# Patient Record
Sex: MALE | Race: ASIAN | NOT HISPANIC OR LATINO | Employment: FULL TIME | URBAN - METROPOLITAN AREA
[De-identification: names, ages, dates, MRNs, and addresses within clinical notes are randomized per-mention and may not be internally consistent; named-entity substitution may affect disease eponyms.]

---

## 2021-09-27 ENCOUNTER — HOSPITAL ENCOUNTER (EMERGENCY)
Facility: HOSPITAL | Age: 61
Discharge: HOME | End: 2021-09-27
Attending: EMERGENCY MEDICINE
Payer: MEDICAID

## 2021-09-27 VITALS
HEART RATE: 78 BPM | SYSTOLIC BLOOD PRESSURE: 146 MMHG | HEIGHT: 67 IN | WEIGHT: 140 LBS | BODY MASS INDEX: 21.97 KG/M2 | DIASTOLIC BLOOD PRESSURE: 71 MMHG | RESPIRATION RATE: 19 BRPM | TEMPERATURE: 98 F | OXYGEN SATURATION: 97 %

## 2021-09-27 DIAGNOSIS — S01.81XA FACIAL LACERATION, INITIAL ENCOUNTER: ICD-10-CM
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
## 2021-09-27 DIAGNOSIS — S00.81XA ABRASION OF FACE, INITIAL ENCOUNTER: ICD-10-CM

## 2021-09-27 PROCEDURE — 12013 RPR F/E/E/N/L/M 2.6-5.0 CM: CPT

## 2021-09-27 PROCEDURE — 99284 EMERGENCY DEPT VISIT MOD MDM: CPT | Mod: 25

## 2021-09-27 PROCEDURE — 25000003 PHARM REV CODE 250: Performed by: PHYSICIAN ASSISTANT

## 2021-09-27 RX ORDER — LIDOCAINE HYDROCHLORIDE 10 MG/ML
10 INJECTION INFILTRATION; PERINEURAL
Status: COMPLETED | OUTPATIENT
Start: 2021-09-27 | End: 2021-09-27

## 2021-09-27 RX ADMIN — NEOMYCIN AND POLYMYXIN B SULFATES AND BACITRACIN ZINC: 400; 3.5; 5 OINTMENT TOPICAL at 03:09

## 2021-09-27 RX ADMIN — LIDOCAINE HYDROCHLORIDE 10 ML: 10 INJECTION, SOLUTION INFILTRATION; PERINEURAL at 03:09

## 2021-09-29 ENCOUNTER — HOSPITAL ENCOUNTER (EMERGENCY)
Facility: HOSPITAL | Age: 61
Discharge: HOME | End: 2021-09-29
Attending: EMERGENCY MEDICINE
Payer: MEDICAID

## 2021-09-29 VITALS
TEMPERATURE: 98 F | OXYGEN SATURATION: 98 % | HEIGHT: 67 IN | BODY MASS INDEX: 21.97 KG/M2 | SYSTOLIC BLOOD PRESSURE: 167 MMHG | WEIGHT: 140 LBS | DIASTOLIC BLOOD PRESSURE: 79 MMHG | HEART RATE: 79 BPM | RESPIRATION RATE: 16 BRPM

## 2021-09-29 DIAGNOSIS — Z48.02 VISIT FOR SUTURE REMOVAL: Primary | ICD-10-CM

## 2021-09-29 PROCEDURE — 99283 EMERGENCY DEPT VISIT LOW MDM: CPT

## 2021-09-29 RX ORDER — IBUPROFEN 600 MG/1
600 TABLET ORAL
Status: DISCONTINUED | OUTPATIENT
Start: 2021-09-29 | End: 2021-09-29 | Stop reason: HOSPADM

## 2021-09-29 RX ORDER — IBUPROFEN 600 MG/1
600 TABLET ORAL EVERY 6 HOURS PRN
Qty: 20 TABLET | Refills: 0 | Status: SHIPPED | OUTPATIENT
Start: 2021-09-29

## 2023-01-06 ENCOUNTER — HOSPITAL ENCOUNTER (EMERGENCY)
Facility: HOSPITAL | Age: 63
Discharge: HOME | End: 2023-01-06
Attending: EMERGENCY MEDICINE
Payer: MEDICAID

## 2023-01-06 VITALS
OXYGEN SATURATION: 98 % | RESPIRATION RATE: 17 BRPM | DIASTOLIC BLOOD PRESSURE: 72 MMHG | SYSTOLIC BLOOD PRESSURE: 124 MMHG | HEART RATE: 85 BPM | TEMPERATURE: 98 F

## 2023-01-06 DIAGNOSIS — U07.1 COVID-19: Primary | ICD-10-CM

## 2023-01-06 DIAGNOSIS — U07.1 COVID-19 VIRUS DETECTED: ICD-10-CM

## 2023-01-06 PROBLEM — J30.9 ALLERGIC RHINITIS: Status: ACTIVE | Noted: 2023-01-06

## 2023-01-06 LAB
CTP QC/QA: YES
MOLECULAR STREP A: NEGATIVE
POC MOLECULAR INFLUENZA A AGN: NEGATIVE
POC MOLECULAR INFLUENZA B AGN: NEGATIVE
SARS-COV-2 RDRP RESP QL NAA+PROBE: POSITIVE

## 2023-01-06 PROCEDURE — 87635 SARS-COV-2 COVID-19 AMP PRB: CPT | Performed by: EMERGENCY MEDICINE

## 2023-01-06 PROCEDURE — 87651 STREP A DNA AMP PROBE: CPT

## 2023-01-06 PROCEDURE — 87502 INFLUENZA DNA AMP PROBE: CPT

## 2023-01-06 PROCEDURE — 99284 EMERGENCY DEPT VISIT MOD MDM: CPT | Mod: 25

## 2023-01-06 RX ORDER — BENZONATATE 100 MG/1
100 CAPSULE ORAL 3 TIMES DAILY PRN
Qty: 20 CAPSULE | Refills: 0 | Status: SHIPPED | OUTPATIENT
Start: 2023-01-06 | End: 2023-10-21 | Stop reason: SDUPTHER

## 2023-01-06 RX ORDER — BENZOCAINE AND MENTHOL 15; 3.6 MG/1; MG/1
1 LOZENGE ORAL
Qty: 16 LOZENGE | Refills: 0 | Status: SHIPPED | OUTPATIENT
Start: 2023-01-06 | End: 2023-10-21 | Stop reason: SDUPTHER

## 2023-01-06 NOTE — DISCHARGE INSTRUCTIONS

## 2023-01-06 NOTE — ED TRIAGE NOTES
Pt. Reports he has been having sneezing, chills and coughing for the past few days. Pt. Reports he has been taking cough OTC meds which has improve his symptoms however pt has Covid concerns.

## 2023-01-06 NOTE — Clinical Note
"Tyler"Florence Dobbins was seen and treated in our emergency department on 1/6/2023.     COVID-19 is present in our communities across the state. There is limited testing for COVID at this time, so not all patients can be tested. In this situation, your employee meets the following criteria:    Tyler Dobbins has met the criteria for COVID-19 testing and has a POSITIVE result. He can return to work once they are asymptomatic for 24 hours without the use of fever reducing medications AND at least five days from the first positive result. A mask is recommended for 5 days post quarantine.     If you have any questions or concerns, or if I can be of further assistance, please do not hesitate to contact me.    Sincerely,             Jairo Dyson NP"

## 2023-01-06 NOTE — ED PROVIDER NOTES
"Encounter Date: 1/6/2023    SCRIBE #1 NOTE: IDameon, am scribing for, and in the presence of,  Jairo Dyson NP. Other sections scribed: HPI, ROS.     History     Chief Complaint   Patient presents with    COVID-19 Concerns     61 yo male to triage for cough, chills, sneezing, runny nose, and sore throat/itching x 3-4 days. NAD, AAOx4     CC: Cough    HPI: This is a 62 y.o. F who has HLD who presents to the ED for emergent evaluation of acute cough, chills, sore throat/itching throat, nasal congestion, rhinorrhea. His symptoms began 5 days ago. He has been taking Robitussin prescribed by his PCP with "about 50% improvement." Denies shortness of breath, chest pain, abd pain, GI symptoms, otalgia. Pt has no other complaints.    The history is provided by the patient. No  was used.   Review of patient's allergies indicates:   Allergen Reactions    Ace inhibitors Anaphylaxis     Past Medical History:   Diagnosis Date    Hyperlipemia     Ulcer      History reviewed. No pertinent surgical history.  History reviewed. No pertinent family history.  Social History     Tobacco Use    Smoking status: Every Day    Smokeless tobacco: Never   Substance Use Topics    Alcohol use: No    Drug use: No     Review of Systems   Constitutional:  Negative for fever.   HENT:  Positive for congestion and sore throat.    Respiratory:  Positive for cough. Negative for shortness of breath.    Cardiovascular:  Negative for chest pain.   Gastrointestinal:  Negative for abdominal pain, diarrhea, nausea and vomiting.   Genitourinary:  Negative for dysuria.   Musculoskeletal:  Negative for back pain.   Skin:  Negative for rash.   Neurological:  Negative for weakness.   Hematological:  Does not bruise/bleed easily.     Physical Exam     Initial Vitals [01/06/23 1344]   BP Pulse Resp Temp SpO2   124/72 74 17 98.4 °F (36.9 °C) 98 %      MAP       --         Physical Exam    Nursing note and vitals " reviewed.  Constitutional: He appears well-developed and well-nourished. He is not diaphoretic. No distress.   HENT:   Head: Normocephalic and atraumatic.   Right Ear: External ear normal.   Left Ear: External ear normal.   Nose: Nose normal.   Eyes: EOM are normal. Right eye exhibits no discharge. Left eye exhibits no discharge.   Neck: Neck supple. No tracheal deviation present.   Normal range of motion.  Cardiovascular:  Normal rate.           Pulmonary/Chest: No stridor. No respiratory distress.   Abdominal: Abdomen is soft. He exhibits no distension. There is no abdominal tenderness.   Musculoskeletal:         General: No tenderness. Normal range of motion.      Cervical back: Normal range of motion and neck supple.     Neurological: He is alert and oriented to person, place, and time. He has normal strength. No cranial nerve deficit.   Skin: Skin is warm and dry.   Psychiatric: He has a normal mood and affect. His behavior is normal. Judgment and thought content normal.       ED Course   Procedures  Labs Reviewed   SARS-COV-2 RDRP GENE - Abnormal; Notable for the following components:       Result Value    POC Rapid COVID Positive (*)     All other components within normal limits   POCT INFLUENZA A/B MOLECULAR   POCT STREP A MOLECULAR          Imaging Results              X-Ray Chest AP Portable (Final result)  Result time 01/06/23 14:47:36      Final result by Veena Harris MD (01/06/23 14:47:36)                   Impression:      No acute finding or interval change      Electronically signed by: Veena Harris MD  Date:    01/06/2023  Time:    14:47               Narrative:    EXAMINATION:  XR CHEST AP PORTABLE    CLINICAL HISTORY:  COVID-19    TECHNIQUE:  Single frontal view of the chest was performed.    COMPARISON:  December 2014    FINDINGS:  There are surgical clips about the right chest wall.  Cardiac size is not enlarged.  There is no pleural effusion.  The lung fields are clear.  Osseous  structures demonstrate no significant abnormality.                                       Medications - No data to display  Medical Decision Making:   History:   Old Medical Records: I decided to obtain old medical records.  Clinical Tests:   Lab Tests: Ordered and Reviewed  Radiological Study: Ordered and Reviewed  ED Management:  DDx:  Influenza, viral syndrome, COVID-19, strep pharyngitis, viral pharyngitis, otitis media, sinusitis, pneumonia, bronchitis, meningitis, sepsis, others    HPI and physical exam as above.      The patient appears to have a viral infection.  Positive for COVID-19 in the ED.  Based upon the history and physical exam the patient does not appear to have a serious bacterial infection such as sepsis, otitis media, bacterial sinusitis, strep pharyngitis, parapharyngeal or peritonsillar abscess, meningitis. Chest x-ray shows nothing acute.  Respiratory effort is normal.  Mucous membranes are moist and the patient is tolerating P.O. without difficulty.  Patient is afebrile throughout the ED course.  Patient is nontoxic, alert, active, and appears very well at this time just prior to discharge.  Room air oxygen saturation 98% while ambulatory.  I have given specific return precautions to the patient regarding dyspnea.  I will prescribe medications to treat the patient's symptoms.     The results and physical exam findings were reviewed with the patient.  I advised the patient to remain home and self-isolate from others. The patient should wear a surgical mask at all times when near others.  Strict ED return precautions given especially concerning worsening shortness of breath/dyspnea. All questions regarding diagnosis and plan were answered to the patient's fullest possible satisfaction. Patient expressed understanding of diagnosis, discharge instructions, and return precautions.          Scribe Attestation:   Scribe #1: I performed the above scribed service and the documentation accurately  describes the services I performed. I attest to the accuracy of the note.                 I, Jairo Dyson NP, personally performed the services described in this documentation. All medical record entries made by the scribe were at my direction and in my presence. I have reviewed the chart and agree that the record reflects my personal performance and is accurate and complete.    Clinical Impression:   Final diagnoses:  [U07.1] COVID-19 (Primary)        ED Disposition Condition    Discharge Stable          ED Prescriptions       Medication Sig Dispense Start Date End Date Auth. Provider    nirmatrelvir-ritonavir 300 mg (150 mg x 2)-100 mg copackaged tablets (EUA) Take 3 tablets by mouth 2 (two) times daily for 5 days. Each dose contains 2 nirmatrelvir (pink tablets) and 1 ritonavir (white tablet). Take all 3 tablets together 30 tablet 1/6/2023 1/11/2023 Jairo Dyson NP    benzocaine-menthoL (CEPACOL SORE THROAT, SAJAN-MEN,) 15-3.6 mg Lozg 1 lozenge by Mucous Membrane route every 3 (three) hours as needed (Sore Throat). 16 lozenge 1/6/2023 -- Jairo Dyson NP    benzonatate (TESSALON) 100 MG capsule Take 1 capsule (100 mg total) by mouth 3 (three) times daily as needed for Cough. 20 capsule 1/6/2023 -- Jairo Dyson NP          Follow-up Information       Follow up With Specialties Details Why Contact Info    [facility]  Schedule an appointment as soon as possible for a visit  For further evaluation [address] [facility] [address]  [phone]      [facility] Emergency Dept Emergency Medicine Go to  If symptoms worsen, As needed [address]  [facility] [address] [phone]             Jairo Dyson NP  01/06/23 8418

## 2023-02-22 ENCOUNTER — HOSPITAL ENCOUNTER (EMERGENCY)
Facility: HOSPITAL | Age: 63
Discharge: HOME | End: 2023-02-22
Attending: EMERGENCY MEDICINE
Payer: MEDICAID

## 2023-02-22 VITALS
WEIGHT: 152 LBS | BODY MASS INDEX: 23.86 KG/M2 | TEMPERATURE: 99 F | HEIGHT: 67 IN | OXYGEN SATURATION: 96 % | RESPIRATION RATE: 18 BRPM | DIASTOLIC BLOOD PRESSURE: 67 MMHG | SYSTOLIC BLOOD PRESSURE: 155 MMHG | HEART RATE: 74 BPM

## 2023-02-22 DIAGNOSIS — R06.7: Primary | ICD-10-CM

## 2023-02-22 DIAGNOSIS — H04.201: Primary | ICD-10-CM

## 2023-02-22 PROCEDURE — 99282 EMERGENCY DEPT VISIT SF MDM: CPT

## 2023-02-22 RX ORDER — OMEPRAZOLE 40 MG/1
40 CAPSULE, DELAYED RELEASE ORAL DAILY
COMMUNITY

## 2023-02-22 RX ORDER — LOSARTAN POTASSIUM 50 MG/1
50 TABLET ORAL DAILY
COMMUNITY

## 2023-02-22 RX ORDER — ATORVASTATIN CALCIUM 10 MG/1
10 TABLET, FILM COATED ORAL DAILY
COMMUNITY

## 2023-02-22 RX ORDER — ACETAMINOPHEN 500 MG
1000 TABLET ORAL
Status: DISCONTINUED | OUTPATIENT
Start: 2023-02-22 | End: 2023-02-23 | Stop reason: HOSPADM

## 2023-02-22 RX ORDER — CETIRIZINE HYDROCHLORIDE 10 MG/1
10 TABLET ORAL DAILY
Qty: 10 TABLET | Refills: 0 | Status: SHIPPED | OUTPATIENT
Start: 2023-02-22 | End: 2023-10-21

## 2023-02-22 RX ORDER — FLUTICASONE PROPIONATE 50 MCG
1 SPRAY, SUSPENSION (ML) NASAL DAILY
COMMUNITY
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
## 2023-02-23 NOTE — DISCHARGE INSTRUCTIONS
Zyrtec once daily.  Begin taking omeprazole every day.  Continue with Flonase as needed for congestion or runny nose.  If no relief of your symptoms over the next 7-10 days, return to this ED for re-evaluation.    Thank you for coming to our Emergency Department today. It is important to remember that some problems or medical conditions are difficult to diagnose and may not be found or addressed during your Emergency Department visit.     Be sure to follow up with your primary care doctor and review all labs/imaging/tests that were performed during your ER visit with them. Some labs/imaging/tests may be outside of the normal range, and require non-emergent follow-up and/or further investigation/treatment/procedures/testing to help diagnose/exclude/prevent complications or other potentially serious medical conditions that were not discussed or addressed during your ER visit.    If you do not have a primary care doctor, you may contact the one listed on your discharge paperwork or you may also call the [facility] Appointment Desk at [phone] to schedule an appointment and establish care with one. It is important to your health that you have a primary care doctor.    Please take all medications as directed. All medications may potentially have side-effects and it is impossible to predict which medications may give you side-effects or what side-effects (if any) they will give you.. If you feel that you are having a negative effect or side-effect of any medication you should immediately stop taking them and seek medical attention. If you feel that you are having a life-threatening reaction call 911.    Return to the ER with any questions/concerns, new/concerning symptoms, worsening or failure to improve.     Do not drive, swim, climb to height, take a bath, operate heavy machinery, drink alcohol or take potentially sedating medications, sign any legal documents or make any important decisions for 24 hours if  you have received any pain medications, sedatives or mood altering drugs during your ER visit or within 24 hours of taking them if they have been prescribed to you.     You can find additional resources for Dentists, hearing aids, durable medical equipment, low cost pharmacies and other resources at [email or web address]

## 2023-02-23 NOTE — ED TRIAGE NOTES
Patient with complaints of itchy eyes, sneezing, nasal stuffiness and intermittent HA x 3 weeks. States had covid last month. Tested negative for covid and flu on 2/15 with his PCP.  Using tylenol and nasal spray without improvement.

## 2023-07-11 ENCOUNTER — HOSPITAL ENCOUNTER (EMERGENCY)
Facility: HOSPITAL | Age: 63
Discharge: HOME | End: 2023-07-11
Attending: EMERGENCY MEDICINE
Payer: MEDICAID

## 2023-07-11 VITALS
SYSTOLIC BLOOD PRESSURE: 133 MMHG | DIASTOLIC BLOOD PRESSURE: 71 MMHG | TEMPERATURE: 98 F | RESPIRATION RATE: 15 BRPM | OXYGEN SATURATION: 100 % | HEIGHT: 66 IN | WEIGHT: 145 LBS | BODY MASS INDEX: 23.3 KG/M2 | HEART RATE: 62 BPM

## 2023-07-11 DIAGNOSIS — J30.2 SEASONAL ALLERGIES: Primary | ICD-10-CM

## 2023-07-11 DIAGNOSIS — M54.2 NECK PAIN: ICD-10-CM

## 2023-07-11 PROCEDURE — 25000003 PHARM REV CODE 250: Performed by: PHYSICIAN ASSISTANT

## 2023-07-11 PROCEDURE — 99284 EMERGENCY DEPT VISIT MOD MDM: CPT

## 2023-07-11 RX ORDER — ERYTHROMYCIN 5 MG/G
OINTMENT OPHTHALMIC
Qty: 1 G | Refills: 0 | Status: SHIPPED | OUTPATIENT
Start: 2023-07-11

## 2023-07-11 RX ORDER — LEVOCETIRIZINE DIHYDROCHLORIDE 5 MG/1
5 TABLET, FILM COATED ORAL NIGHTLY
Qty: 30 TABLET | Refills: 0 | OUTPATIENT
Start: 2023-07-11 | End: 2023-10-21

## 2023-07-11 RX ORDER — ERYTHROMYCIN 5 MG/G
OINTMENT OPHTHALMIC
Status: COMPLETED | OUTPATIENT
Start: 2023-07-11 | End: 2023-07-11

## 2023-07-11 RX ORDER — LIDOCAINE 50 MG/G
1 PATCH TOPICAL DAILY
Qty: 15 PATCH | Refills: 0 | Status: SHIPPED | OUTPATIENT
Start: 2023-07-11 | End: 2023-07-26

## 2023-07-11 RX ADMIN — ERYTHROMYCIN 1 INCH: 5 OINTMENT OPHTHALMIC at 07:07

## 2023-07-11 NOTE — FIRST PROVIDER EVALUATION
Medical screening examination initiated.  I have conducted a focused provider triage encounter, findings are as follows:    Brief history of present illness:  3 months b/l eye itching, nasal congestion. Also notes neck discomfort x 3 months improved with tylenol. No falls/trauma/injuries    There were no vitals filed for this visit.    Pertinent physical exam:  There were no vitals filed for this visit.    Pt is well appearing, sitting up in no distress  HEADt: normocephalic, atraumatic  Eyes: EOMI, PERRL, ANICTERIC  Chest: normal chest expansion, no respiratory distress  CV: normal rate  Abd: non distended  Ext: no edema  Psych: linear goal directed thinking, no si/hi  Neuro: no tremor      Brief workup plan:  xr neck given age/duration. Suspect allergic rhinitis/conjunct.    Preliminary workup initiated; this workup will be continued and followed by the physician or advanced practice provider that is assigned to the patient when roomed.

## 2023-07-12 ENCOUNTER — TELEPHONE (OUTPATIENT)
Dept: EMERGENCY MEDICINE | Facility: HOSPITAL | Age: 63
End: 2023-07-12
Payer: MEDICAID

## 2023-07-12 DIAGNOSIS — H57.89 PERIORBITAL SWELLING: Primary | ICD-10-CM

## 2023-07-12 NOTE — DISCHARGE INSTRUCTIONS

## 2023-07-12 NOTE — ED PROVIDER NOTES
Encounter Date: 7/11/2023    SCRIBE #1 NOTE: I, Nika Marcum, am scribing for, and in the presence of,  Teagan Lee PA-C. I have scribed the following portions of the note - Other sections scribed: HPI, ROS, PE.     History     Chief Complaint   Patient presents with    Allergies     Pt c/o eye swelling, congestion and itchy throat x 3 months. Pt also c/o chronic neck pain which is relieved by OTC medications. Pt denies blurred vision     63 years old with a PMHx of HLD, HTN, who presents to the ED for a chief complaints of swelling in the left eyelid, nasal congestion, and itchy throat onset 3 months ago. Patient reports his eyelids were swelling, pain and itching bilaterally since 02/2023. Patient further reports after he took Zyrtec and Allegra, the symptoms in his right eye were relieved, but not the L eye. Pt reports he sometimes has difficulty seeing due to eyelid swelling but denies visual disturbance contact lens use   Patient states that all the allergy medications (Zyrtec, Allegra, and Claritin) that he has been prescribed so far do not alleviate his nasal congestion, rhinorrhea, and swelling, pain and itching in the L eyelid. Endorses sneezing, chronic back and neck pain, which are relieved by OTC medications. No other alleviating or exacerbating factors. Denies any fever, chills, abdominal pain, shortness of breath, headache, dizziness, nausea, vomiting, diarrhea, dysuria, or other associated symptoms.         The history is provided by the patient. The history is limited by a language barrier. A  was used (Offered formal  but pt opting to use scribe).   Review of patient's allergies indicates:   Allergen Reactions    Ace inhibitors Anaphylaxis     Past Medical History:   Diagnosis Date    Hyperlipemia     Hypertension     Ulcer      No past surgical history on file.  No family history on file.  Social History     Tobacco Use    Smoking status: Every Day      Types: Cigarettes    Smokeless tobacco: Never   Substance Use Topics    Alcohol use: No    Drug use: No     Review of Systems   Constitutional:  Negative for chills and fever.   HENT:  Positive for congestion, rhinorrhea (intermittent) and sneezing. Negative for ear pain and sore throat.         (+) itching throat   Eyes:  Positive for itching. Negative for photophobia, pain, discharge, redness and visual disturbance (blurred vision).        (+) swelling, pain and itching in the L eyelid   Respiratory:  Negative for shortness of breath and stridor.    Cardiovascular:  Negative for chest pain.   Gastrointestinal:  Negative for abdominal pain, constipation, diarrhea, nausea and vomiting.   Genitourinary:  Negative for dysuria, frequency, hematuria and urgency.   Musculoskeletal:  Positive for back pain (chronic) and neck pain (chronic).   Skin:  Negative for rash.   Neurological:  Negative for dizziness, speech difficulty, weakness, light-headedness, numbness and headaches.   Hematological:  Does not bruise/bleed easily.   Psychiatric/Behavioral:  Negative for confusion.      Physical Exam     Initial Vitals [07/11/23 1654]   BP Pulse Resp Temp SpO2   (!) 145/72 69 15 97.6 °F (36.4 °C) 99 %      MAP       --         Physical Exam    Nursing note and vitals reviewed.  Constitutional: He appears well-developed and well-nourished. No distress.   HENT:   Head: Normocephalic.   Right Ear: External ear normal.   Left Ear: External ear normal.   Nose: No mucosal edema or rhinorrhea. Right sinus exhibits no maxillary sinus tenderness and no frontal sinus tenderness. Left sinus exhibits no maxillary sinus tenderness and no frontal sinus tenderness.   Eyes: Conjunctivae, EOM and lids are normal. Pupils are equal, round, and reactive to light. Right eye exhibits no chemosis and no discharge. Left eye exhibits no chemosis and no discharge. Right conjunctiva is not injected. Left conjunctiva is not injected. Right eye exhibits  normal extraocular motion. Left eye exhibits normal extraocular motion.   Mild swelling to L eyelid  No ttp or erythema  No stye    Neck:   Cervical spine neurologically intact     Normal range of motion.  Pulmonary/Chest: No respiratory distress.   Musculoskeletal:         General: Normal range of motion.      Cervical back: Normal range of motion. Muscular tenderness present. No spinous process tenderness.     Neurological: He is alert.   Skin: Skin is warm and dry. No rash noted.   Psychiatric: He has a normal mood and affect. His behavior is normal. Judgment and thought content normal.       ED Course   Procedures  Labs Reviewed - No data to display       Imaging Results              X-Ray Cervical Spine AP And Lateral (Final result)  Result time 07/11/23 17:30:15      Final result by Starr Bustamante MD (07/11/23 17:30:15)                   Impression:      No acute cervical spine abnormalities identified.      Electronically signed by: Starr Bustamante MD  Date:    07/11/2023  Time:    17:30               Narrative:    EXAMINATION:  XR CERVICAL SPINE AP LATERAL    CLINICAL HISTORY:  Cervicalgia    TECHNIQUE:  AP, lateral and open mouth views of the cervical spine were performed.    COMPARISON:  None.    FINDINGS:  No evidence of acute cervical spine fracture or subluxation.  Cervical spine alignment is within normal limits.  Odontoid process appears intact.  Surrounding soft tissues show no significant abnormalities.                                       Medications   erythromycin 5 mg/gram (0.5 %) ophthalmic ointment (1 inch Left Eye Given 7/11/23 1949)     Medical Decision Making:   History:   Old Medical Records: I decided to obtain old medical records.  Old Records Summarized: other records.       <> Summary of Records: External documents reviewed.  Clinical Tests:   Lab Tests: Ordered and Reviewed  Radiological Study: Ordered and Reviewed  ED Management:  62 y/o M presenting for multiple complaints  Has  had eyelid itching and swelling and occasional pain for 5 months  Has tried multiple antihistamines and been seen by multiple providers but states the antihistamines were making him too drowsy.  Currently taking loratadine.  He denies any visual disturbance apart from feeling he is not able to see well due to the swelling in his left eyelid.  No fever.  No evidence of periorbital cellulitis.  No actual eye pain.  Considered doubt glaucoma.  Erythromycin applied.  Will refer to ENT as this is likely secondary to allergies.  Ophtho and Optometry as well.    Additionally complaining of neck pain.  This is chronic.  No new falls or trauma.  X-ray of the C-spine negative for fracture dislocation.  No neurovascular deficits.  Will treat with Lidoderm patches in have him follow up with primary care.  Return ER for worsening or as needed.        Scribe Attestation:   Scribe #1: I performed the above scribed service and the documentation accurately describes the services I performed. I attest to the accuracy of the note.                 Scribe attestation: I, Teagan Portillo PA-C, personally performed the services described in this documentation. All medical record entries made by the scribe were at my direction and in my presence.  I have reviewed the chart and agree that the record reflects my personal performance and is accurate and complete.    Clinical Impression:   Final diagnoses:  [M54.2] Neck pain  [M54.2] Neck pain - chronic, no trauma  [J30.2] Seasonal allergies (Primary)        ED Disposition Condition    Discharge Stable          ED Prescriptions       Medication Sig Dispense Start Date End Date Auth. Provider    levocetirizine (XYZAL) 5 MG tablet Take 1 tablet (5 mg total) by mouth every evening. 30 tablet 7/11/2023 8/10/2023 Teagan Lee PA-C    erythromycin (ROMYCIN) ophthalmic ointment Place a 1/2 inch ribbon of ointment into the lower eyelid tid for 7 days 1 g 7/11/2023 -- Teagan Lee  CATERINA    LIDOcaine (LIDODERM) 5 % Place 1 patch onto the skin once daily. Remove & Discard patch within 12 hours or as directed by MD. May use 4% over the counter if not covered by insurance for 15 days 15 patch 7/11/2023 7/26/2023 Teagan Lee PA-C          Follow-up Information       Follow up With Specialties Details Why Contact Info    Your Primary Care Doctor  Schedule an appointment as soon as possible for a visit in 2 days      [facility] Emergency Dept Emergency Medicine Go to  As needed, If symptoms worsen [id number]  [facility] [id number] [phone]             Teagan Lee PA-C  07/12/23 5542

## 2023-10-21 ENCOUNTER — HOSPITAL ENCOUNTER (EMERGENCY)
Facility: HOSPITAL | Age: 63
Discharge: HOME | End: 2023-10-21
Attending: EMERGENCY MEDICINE
Payer: MEDICAID

## 2023-10-21 VITALS
WEIGHT: 145 LBS | OXYGEN SATURATION: 100 % | RESPIRATION RATE: 19 BRPM | DIASTOLIC BLOOD PRESSURE: 76 MMHG | BODY MASS INDEX: 23.4 KG/M2 | HEART RATE: 67 BPM | TEMPERATURE: 99 F | SYSTOLIC BLOOD PRESSURE: 128 MMHG

## 2023-10-21 DIAGNOSIS — R05.9 COUGH: ICD-10-CM

## 2023-10-21 LAB
CTP QC/QA: YES
SARS-COV-2 RDRP RESP QL NAA+PROBE: NEGATIVE

## 2023-10-21 PROCEDURE — 99283 EMERGENCY DEPT VISIT LOW MDM: CPT | Mod: 25

## 2023-10-21 PROCEDURE — 87635 SARS-COV-2 COVID-19 AMP PRB: CPT | Performed by: EMERGENCY MEDICINE

## 2023-10-21 RX ORDER — BENZONATATE 100 MG/1
100 CAPSULE ORAL 3 TIMES DAILY PRN
Qty: 20 CAPSULE | Refills: 0 | Status: SHIPPED | OUTPATIENT
Start: 2023-10-21

## 2023-10-21 RX ORDER — BENZOCAINE AND MENTHOL 15; 3.6 MG/1; MG/1
1 LOZENGE ORAL
Qty: 16 LOZENGE | Refills: 0 | Status: SHIPPED | OUTPATIENT
Start: 2023-10-21

## 2023-10-21 RX ORDER — LORATADINE 10 MG/1
10 TABLET ORAL DAILY
Qty: 30 TABLET | Refills: 0 | Status: SHIPPED | OUTPATIENT
Start: 2023-10-21 | End: 2023-11-20

## 2023-10-21 NOTE — ED TRIAGE NOTES
Pt presents to the Er after taking a home Covid Test that came up positive. Pt states he has a cough, congestion and weakness. Pt AAO x 4 and denies any other complications.

## 2023-10-21 NOTE — ED PROVIDER NOTES
Encounter Date: 10/21/2023       History     Chief Complaint   Patient presents with    COVID-19 Concerns     Pt states cough, congestion and weakness, he tested for COVID at home and it was positive.  Wants to make sure.      3-year-old male past medical history significant for hypertension, hyperlipidemia presents emergency room for evaluation of COVID test.  Patient tells me that he has had cough, congestion, chills and myalgias for the last 2 days.  He took an at home COVID test which was positive.  He presents asking for retesting.  He also asked me if I can obtain a chest x-ray.  He has no chest pain or shortness of breath.  No nausea, vomiting.    The history is provided by the patient. No  was used.     Review of patient's allergies indicates:   Allergen Reactions    Ace inhibitors Anaphylaxis     Past Medical History:   Diagnosis Date    Hyperlipemia     Hypertension     Ulcer      No past surgical history on file.  No family history on file.  Social History     Tobacco Use    Smoking status: Every Day     Types: Cigarettes    Smokeless tobacco: Never   Substance Use Topics    Alcohol use: No    Drug use: No     Review of Systems   Constitutional:  Positive for chills. Negative for fatigue and fever.   HENT:  Positive for congestion. Negative for hearing loss, sore throat and trouble swallowing.    Eyes:  Negative for visual disturbance.   Respiratory:  Positive for cough. Negative for chest tightness and shortness of breath.    Cardiovascular:  Negative for chest pain.   Gastrointestinal:  Negative for abdominal pain and nausea.   Endocrine: Negative for polyuria.   Genitourinary:  Negative for difficulty urinating.   Musculoskeletal:  Negative for arthralgias and myalgias.   Skin:  Negative for rash.   Neurological:  Negative for dizziness and headaches.   Psychiatric/Behavioral:  The patient is not nervous/anxious.        Physical Exam     Initial Vitals [10/21/23 1303]   BP Pulse  Resp Temp SpO2   (!) 153/74 73 20 97.8 °F (36.6 °C) 96 %      MAP       --         Physical Exam    Nursing note and vitals reviewed.  Constitutional: He appears well-developed and well-nourished.   HENT:   Head: Normocephalic and atraumatic.   Right Ear: External ear normal.   Left Ear: External ear normal.   Nose: Nose normal.   Mouth/Throat: Oropharynx is clear and moist.   Eyes: Conjunctivae and EOM are normal.   Neck: Neck supple.   Cardiovascular:  Normal rate, regular rhythm, normal heart sounds and intact distal pulses.           Pulmonary/Chest: Breath sounds normal. No respiratory distress. He has no wheezes. He has no rhonchi. He has no rales. He exhibits no tenderness.   Musculoskeletal:         General: Normal range of motion.      Cervical back: Neck supple.     Neurological: He is alert and oriented to person, place, and time. GCS score is 15. GCS eye subscore is 4. GCS verbal subscore is 5. GCS motor subscore is 6.   Skin: Skin is warm and dry. Capillary refill takes less than 2 seconds.   Psychiatric: He has a normal mood and affect. His behavior is normal. Judgment and thought content normal.         ED Course   Procedures  Labs Reviewed   SARS-COV-2 RDRP GENE          Imaging Results              X-Ray Chest PA And Lateral (Final result)  Result time 10/21/23 14:36:30      Final result by Mellisa Muñoz MD (10/21/23 14:36:30)                   Impression:      No acute intrathoracic process seen.      Electronically signed by: Mellisa Muñoz MD  Date:    10/21/2023  Time:    14:36               Narrative:    EXAMINATION:  XR CHEST PA AND LATERAL    TECHNIQUE:  PA and lateral views of the chest were performed.    COMPARISON:  01/06/2023    FINDINGS:  The cardiac silhouette is normal in size, midline.    The pulmonary vascularity is normal.    The pulmonary emi appear normal bilaterally.    The lungs are well expanded and clear.    No focal airspace disease.    No pleural effusion, no  pneumothorax.    The osseous structures appear within normal limits for age.    Surgical clips right upper thoracic region.                                       Medications - No data to display  Medical Decision Making  61-year-old male presents emergency room for viral URI like symptoms.  He is nontoxic and well appearing, no acute distress.  Lungs CTA, not hypoxic.  He did have a positive COVID test at home however negative here.  Chest x-ray without acute cardiopulmonary abnormality.  Low suspicion for alternative or overlying bacterial infection, including pneumonia, bacterial pharyngitis, abscess, sinusitis.  Given he is overall well-appearing, Will treat symptomatically with Tessalon Perles, Cepacol, Claritin.  Recommend close follow-up with primary care provider.  Strict and strong return precautions were discussed.  At time of discharge, patient is stable.    Amount and/or Complexity of Data Reviewed  Radiology: ordered and independent interpretation performed. Decision-making details documented in ED Course.    Risk  OTC drugs.  Prescription drug management.                               Clinical Impression:   Final diagnoses:  [R05.9] Cough        ED Disposition Condition    Discharge Stable          ED Prescriptions       Medication Sig Dispense Start Date End Date Auth. Provider    benzocaine-menthoL (CEPACOL SORE THROAT, SAJAN-MEN,) 15-3.6 mg Lozg 1 lozenge by Mucous Membrane route every 3 (three) hours as needed (Sore Throat). 16 lozenge 10/21/2023 -- Avery Nelson PA-C    benzonatate (TESSALON) 100 MG capsule Take 1 capsule (100 mg total) by mouth 3 (three) times daily as needed for Cough. 20 capsule 10/21/2023 -- Avery Nelson PA-C    loratadine (CLARITIN) 10 mg tablet Take 1 tablet (10 mg total) by mouth once daily. 30 tablet 10/21/2023 11/20/2023 Avery Nelson PA-C          Follow-up Information       Follow up With Specialties Details Why Contact Info    [facility] - Emergency Dept  Emergency Medicine Go to  If symptoms worsen, As needed [address] [place] [address] [phone]    Primary Care Manager  Schedule an appointment as soon as possible for a visit in 1 week               Avery Nelson PA-C  10/21/23 1450

## 2023-10-21 NOTE — DISCHARGE INSTRUCTIONS
You were evaluated and treated in the emergency department today. You were found to have a diagnoses that can be managed well at home, however that requires your commitment to getting better.   Problem-Specific Instructions:  Follow-up with primary care.  Return for worsening cough, congestion, shortness breath, chest pain.      Ensure you follow up with your Primary Care Provider or any additional providers listed on this discharge sheet. While you may be healthy enough to go home today, I cannot predict the exact course of your diagnoses. As such, it is your responsibility to monitor symptoms, follow-up with another healthcare provider, or return to the emergency room for new or worsening concerns. Unless otherwise instructed, continue all home medications and any new medications prescribed to you in the Emergency Department.   General Maintenance: Ensure adequate hydration to prevent prolonged illness and recovery. Monitor your caloric intake with a goal of obtaining and maintaining a healthy weight to help prevent the development of chronic and life-threatening medical conditions. Start healthy fitness habits and aim for a goal of 30 minutes to an hour of exercise 3-5 times a week. Avoid the use of tobacco, alcohol, and illicit drugs as these may be detrimental to your health goals.

## 2023-11-07 ENCOUNTER — OFFICE VISIT (OUTPATIENT)
Dept: OTOLARYNGOLOGY | Facility: CLINIC | Age: 63
End: 2023-11-07
Payer: MEDICAID

## 2023-11-07 VITALS
HEART RATE: 73 BPM | SYSTOLIC BLOOD PRESSURE: 147 MMHG | WEIGHT: 146.38 LBS | DIASTOLIC BLOOD PRESSURE: 88 MMHG | BODY MASS INDEX: 23.63 KG/M2

## 2023-11-07 DIAGNOSIS — J34.2 NASAL SEPTAL DEVIATION: ICD-10-CM

## 2023-11-07 DIAGNOSIS — J34.3 HYPERTROPHY OF BOTH INFERIOR NASAL TURBINATES: ICD-10-CM

## 2023-11-07 DIAGNOSIS — J30.89 ALLERGIC RHINITIS DUE TO OTHER ALLERGIC TRIGGER, UNSPECIFIED SEASONALITY: ICD-10-CM

## 2023-11-07 DIAGNOSIS — K04.5 PERIAPICAL PERIODONTITIS: ICD-10-CM

## 2023-11-07 DIAGNOSIS — J30.2 SEASONAL ALLERGIES: Primary | ICD-10-CM

## 2023-11-07 PROCEDURE — 3079F PR MOST RECENT DIASTOLIC BLOOD PRESSURE 80-89 MM HG: ICD-10-PCS | Mod: CPTII,,, | Performed by: STUDENT IN AN ORGANIZED HEALTH CARE EDUCATION/TRAINING PROGRAM

## 2023-11-07 PROCEDURE — 99999 PR PBB SHADOW E&M-EST. PATIENT-LVL IV: ICD-10-PCS | Mod: PBBFAC,,, | Performed by: STUDENT IN AN ORGANIZED HEALTH CARE EDUCATION/TRAINING PROGRAM

## 2023-11-07 PROCEDURE — 1160F PR REVIEW ALL MEDS BY PRESCRIBER/CLIN PHARMACIST DOCUMENTED: ICD-10-PCS | Mod: CPTII,,, | Performed by: STUDENT IN AN ORGANIZED HEALTH CARE EDUCATION/TRAINING PROGRAM

## 2023-11-07 PROCEDURE — 31231 PR NASAL ENDOSCOPY, DX: ICD-10-PCS | Mod: S$PBB,,, | Performed by: STUDENT IN AN ORGANIZED HEALTH CARE EDUCATION/TRAINING PROGRAM

## 2023-11-07 PROCEDURE — 3008F BODY MASS INDEX DOCD: CPT | Mod: CPTII,,, | Performed by: STUDENT IN AN ORGANIZED HEALTH CARE EDUCATION/TRAINING PROGRAM

## 2023-11-07 PROCEDURE — 99203 OFFICE O/P NEW LOW 30 MIN: CPT | Mod: 25,S$PBB,, | Performed by: STUDENT IN AN ORGANIZED HEALTH CARE EDUCATION/TRAINING PROGRAM

## 2023-11-07 PROCEDURE — 99214 OFFICE O/P EST MOD 30 MIN: CPT | Mod: PBBFAC,25 | Performed by: STUDENT IN AN ORGANIZED HEALTH CARE EDUCATION/TRAINING PROGRAM

## 2023-11-07 PROCEDURE — 3008F PR BODY MASS INDEX (BMI) DOCUMENTED: ICD-10-PCS | Mod: CPTII,,, | Performed by: STUDENT IN AN ORGANIZED HEALTH CARE EDUCATION/TRAINING PROGRAM

## 2023-11-07 PROCEDURE — 3077F PR MOST RECENT SYSTOLIC BLOOD PRESSURE >= 140 MM HG: ICD-10-PCS | Mod: CPTII,,, | Performed by: STUDENT IN AN ORGANIZED HEALTH CARE EDUCATION/TRAINING PROGRAM

## 2023-11-07 PROCEDURE — 1159F MED LIST DOCD IN RCRD: CPT | Mod: CPTII,,, | Performed by: STUDENT IN AN ORGANIZED HEALTH CARE EDUCATION/TRAINING PROGRAM

## 2023-11-07 PROCEDURE — 3079F DIAST BP 80-89 MM HG: CPT | Mod: CPTII,,, | Performed by: STUDENT IN AN ORGANIZED HEALTH CARE EDUCATION/TRAINING PROGRAM

## 2023-11-07 PROCEDURE — 3077F SYST BP >= 140 MM HG: CPT | Mod: CPTII,,, | Performed by: STUDENT IN AN ORGANIZED HEALTH CARE EDUCATION/TRAINING PROGRAM

## 2023-11-07 PROCEDURE — 31231 NASAL ENDOSCOPY DX: CPT | Mod: PBBFAC | Performed by: STUDENT IN AN ORGANIZED HEALTH CARE EDUCATION/TRAINING PROGRAM

## 2023-11-07 PROCEDURE — 4010F PR ACE/ARB THEARPY RXD/TAKEN: ICD-10-PCS | Mod: CPTII,,, | Performed by: STUDENT IN AN ORGANIZED HEALTH CARE EDUCATION/TRAINING PROGRAM

## 2023-11-07 PROCEDURE — 99203 PR OFFICE/OUTPT VISIT, NEW, LEVL III, 30-44 MIN: ICD-10-PCS | Mod: 25,S$PBB,, | Performed by: STUDENT IN AN ORGANIZED HEALTH CARE EDUCATION/TRAINING PROGRAM

## 2023-11-07 PROCEDURE — 4010F ACE/ARB THERAPY RXD/TAKEN: CPT | Mod: CPTII,,, | Performed by: STUDENT IN AN ORGANIZED HEALTH CARE EDUCATION/TRAINING PROGRAM

## 2023-11-07 PROCEDURE — 1160F RVW MEDS BY RX/DR IN RCRD: CPT | Mod: CPTII,,, | Performed by: STUDENT IN AN ORGANIZED HEALTH CARE EDUCATION/TRAINING PROGRAM

## 2023-11-07 PROCEDURE — 31231 NASAL ENDOSCOPY DX: CPT | Mod: S$PBB,,, | Performed by: STUDENT IN AN ORGANIZED HEALTH CARE EDUCATION/TRAINING PROGRAM

## 2023-11-07 PROCEDURE — 1159F PR MEDICATION LIST DOCUMENTED IN MEDICAL RECORD: ICD-10-PCS | Mod: CPTII,,, | Performed by: STUDENT IN AN ORGANIZED HEALTH CARE EDUCATION/TRAINING PROGRAM

## 2023-11-07 PROCEDURE — 99999 PR PBB SHADOW E&M-EST. PATIENT-LVL IV: CPT | Mod: PBBFAC,,, | Performed by: STUDENT IN AN ORGANIZED HEALTH CARE EDUCATION/TRAINING PROGRAM

## 2023-11-07 RX ORDER — AZELASTINE 1 MG/ML
1 SPRAY, METERED NASAL 2 TIMES DAILY
Qty: 30 ML | Refills: 6 | Status: SHIPPED | OUTPATIENT
Start: 2023-11-07 | End: 2024-11-06

## 2023-11-07 RX ORDER — FEXOFENADINE HCL 60 MG
60 TABLET ORAL DAILY
COMMUNITY

## 2023-11-07 NOTE — PROGRESS NOTES
Otolaryngology - Head and Neck Surgery New Patient Visit    11/7/2023    Referring Provider: Teagan Lee,*    Chief Complaint   Patient presents with    Allergy     History of Present Illness, Otolaryngology Specialty-Specific Exam, and Assessment and Plan:     Tyler Dobbins is a 63 y.o. male who presents for evaluation of facial swelling, which has been present for 6 months. He complains of itchy eyes, clear nasal drainage, and sneezing. He believes he has seasonal allergies. He denies purulent nasal drainage, head aches, anosmia, vision changes, or previous nasal trauma. He has been treated with OTC antihistamines and floanse in the past. He has never had allergy testing. He denies previous skullbase surgery. He denies snoring.     He had a CT of the sinuses done on 9/27/21 which I reviewed along with the associated radiology report.    On exam today, the ears are normal. The oral cavity is clear. The neck is clear. The nasopharynx, hypopharynx, and larynx are normal. Nasal endoscopy reveals left septal deviation with spur. Hypertrophic inferior turbinates bilaterally. No nasal masses or nasal polyposis. No purulent drainage in the middle meatus. Nasopharynx clear without lesions. Eustachian tubes WNL.     Impression today is allergic rhinitis and odontogenic sinusitis. I have recommended that he start on intranasal Astelin. I recommended that he be evaluated by his dentist for likely dental extraction to prevent further sinus infections.      Thank you for allowing us to participate in the care of your patient. We will continue to keep you informed of his progress.    Sincerely yours,    James Miller MD      Objective     Physical Examination  Vitals -  weight is 66.4 kg (146 lb 6.2 oz). His blood pressure is 147/88 (abnormal) and his pulse is 73.   Constitutional - General appearance: Normal. Ability to communicate: Normal.  Head & Face - Overall appearance, scars, masses: Normal. Palpation &/or  percussion of face: Normal. Salivary glands: Normal. Facial strength: Normal  ENMT - Otoscopic exam: Normal. Assessment of hearing: Normal. External inspection: Normal. Nasal mucosa, septum, turbinates: Abnormal see exam details. Lips, teeth, gums: poor dentition. Oropharynx: Normal. Pharyngeal walls/pyriform sinus: Normal. Larynx: Normal. Nasopharynx: Normal  Neck - Neck: Normal. Thyroid: Normal  Lymphatic - Palpation of lymph nodes: Normal  Eyes - Ocular mobility: Normal  Respiratory - Inspection of Chest: Normal  Cardiovascular - Peripheral vascular system: Normal  Neurological/Psychiatric - Orientation: Normal    Review of Systems  A complete review of systems was obtained 11/07/2023 and reviewed.  The review of systems is negative for symptoms except as described above.    BP (!) 147/88 (BP Location: Right arm, Patient Position: Sitting, BP Method: Small (Automatic))   Pulse 73   Wt 66.4 kg (146 lb 6.2 oz)   BMI 23.63 kg/m²      Nasal Endoscopy:  11/7/2023    The use of diagnostic nasal endoscopy was considered medically necessary for the evaluation and visualization of the nasal anatomy for symptoms suggestive of nasal or sinus origin. Physical examination (including a nasal speculum evaluation) did not provide sufficient clinical information to establish a diagnosis, or symptoms did not improve or worsened following treatment.     The nasal cavity was decongested with topical 1% phenylephrine and anesthetized with 4% lidocaine.  A rigid 0-degree endoscope was introduced into the nasal cavity.    The patient was seated in the examination chair. After discussion of risks and benefits, a nasal endoscope was inserted into the nose the endoscope was passed along the left nasal floor to the nasopharynx. It was then passed between the middle and superior meatus, nasal turbinates, nasal septum, nasopharynx and sphenoethmoid region. The nasal endoscope was withdrawn and there was no complications. An identical  "procedure was performed on the right side. I was present for the entire procedure.The patient tolerated the above procedure well. The findings of this procedure can be found in the dictated note from 11/7/2023 visit.                                    Data Reviewed    WBC (K/uL)   Date Value   12/20/2014 5.54     Eosinophil % (%)   Date Value   12/20/2014 3.1     Eos # (K/uL)   Date Value   12/20/2014 0.2     Platelets (K/uL)   Date Value   12/20/2014 189     Glucose (mg/dL)   Date Value   12/20/2014 96     No results found for: "IGE"    I independently reviewed the images of the CT sinuses dated 9/27/21. Pertinent findings include left septal deviation. Right maxillary sinus mucosal thickening with periapical lucency. Patchy ethmoid sinus opacification bilaterally. No significant opacification of the frontal or sphenoid sinus.      "

## 2024-02-09 ENCOUNTER — OFFICE VISIT (OUTPATIENT)
Dept: OTOLARYNGOLOGY | Facility: CLINIC | Age: 64
End: 2024-02-09
Payer: MEDICAID

## 2024-02-09 VITALS
SYSTOLIC BLOOD PRESSURE: 117 MMHG | HEART RATE: 69 BPM | BODY MASS INDEX: 24.09 KG/M2 | DIASTOLIC BLOOD PRESSURE: 76 MMHG | WEIGHT: 149.25 LBS

## 2024-02-09 DIAGNOSIS — K04.5 PERIAPICAL PERIODONTITIS: Primary | ICD-10-CM

## 2024-02-09 DIAGNOSIS — J34.2 NASAL SEPTAL DEVIATION: ICD-10-CM

## 2024-02-09 DIAGNOSIS — J34.3 HYPERTROPHY OF BOTH INFERIOR NASAL TURBINATES: ICD-10-CM

## 2024-02-09 PROCEDURE — 99213 OFFICE O/P EST LOW 20 MIN: CPT | Mod: S$PBB,,, | Performed by: STUDENT IN AN ORGANIZED HEALTH CARE EDUCATION/TRAINING PROGRAM

## 2024-02-09 PROCEDURE — 3008F BODY MASS INDEX DOCD: CPT | Mod: CPTII,,, | Performed by: STUDENT IN AN ORGANIZED HEALTH CARE EDUCATION/TRAINING PROGRAM

## 2024-02-09 PROCEDURE — 3078F DIAST BP <80 MM HG: CPT | Mod: CPTII,,, | Performed by: STUDENT IN AN ORGANIZED HEALTH CARE EDUCATION/TRAINING PROGRAM

## 2024-02-09 PROCEDURE — 99214 OFFICE O/P EST MOD 30 MIN: CPT | Mod: PBBFAC | Performed by: STUDENT IN AN ORGANIZED HEALTH CARE EDUCATION/TRAINING PROGRAM

## 2024-02-09 PROCEDURE — 3074F SYST BP LT 130 MM HG: CPT | Mod: CPTII,,, | Performed by: STUDENT IN AN ORGANIZED HEALTH CARE EDUCATION/TRAINING PROGRAM

## 2024-02-09 PROCEDURE — 99999 PR PBB SHADOW E&M-EST. PATIENT-LVL IV: CPT | Mod: PBBFAC,,, | Performed by: STUDENT IN AN ORGANIZED HEALTH CARE EDUCATION/TRAINING PROGRAM

## 2024-02-09 PROCEDURE — 1159F MED LIST DOCD IN RCRD: CPT | Mod: CPTII,,, | Performed by: STUDENT IN AN ORGANIZED HEALTH CARE EDUCATION/TRAINING PROGRAM

## 2024-02-09 PROCEDURE — 1160F RVW MEDS BY RX/DR IN RCRD: CPT | Mod: CPTII,,, | Performed by: STUDENT IN AN ORGANIZED HEALTH CARE EDUCATION/TRAINING PROGRAM

## 2024-02-09 PROCEDURE — 4010F ACE/ARB THERAPY RXD/TAKEN: CPT | Mod: CPTII,,, | Performed by: STUDENT IN AN ORGANIZED HEALTH CARE EDUCATION/TRAINING PROGRAM

## 2024-02-09 NOTE — PROGRESS NOTES
Subjective:      Tyler is a 63 y.o. male who comes for follow-up of sinusitis.  His last visit with me was on 11/7/2023.  Has not seen his dentist yet. Nose feels good. Reports some scant clear drainage that has come out his nose over the last week. No purulent/discolored nasal drainage noted.     His current sinus regime consists of: flonase & astelin.     The assessment of quality and severity of symptoms as measured by the SNOT-22 score is deferred.    The patient's medications, allergies, past medical, surgical, social and family histories were reviewed and updated as appropriate.    ROS     A detailed review of systems was obtained with pertinent positives as per the above HPI, and otherwise negative.        Objective:     /76 (BP Location: Left arm, Patient Position: Sitting, BP Method: Small (Automatic))   Pulse 69   Wt 67.7 kg (149 lb 4 oz)   BMI 24.09 kg/m²        Constitutional:   Vital signs are normal. He appears well-developed and well-nourished.     Head:  Normocephalic and atraumatic.     Ears:  Hearing normal to normal and whispered voice; external ear normal without scars, lesions, or masses; ear canal, tympanic membrane, and middle ear normal..   Right Ear: No swelling. Tympanic membrane is not perforated and not bulging. No middle ear effusion.   Left Ear: No swelling. Tympanic membrane is not perforated and not bulging.  No middle ear effusion.     Nose:  Nose normal including turbinates, nasal mucosa, sinuses and nasal septum. No epistaxis.     Mouth/Throat  Oropharynx clear and moist without lesions or asymmetry and normal uvula midline. Normal dentition. No tonsillar abscesses. Tonsillar exudate.      Neck:  Neck normal without thyromegaly masses, asymmetry, normal tracheal structure, crepitus, and tenderness, thyroid normal, trachea normal, phonation normal, full range of motion with neck supple and no adenopathy. No stridor present.        Head (right side): No submental  "adenopathy present.        Head (left side): No submental adenopathy present.     He has no cervical adenopathy.     Pulmonary/Chest:   No stridor.     Procedure    None    Data Reviewed    WBC (K/uL)   Date Value   12/20/2014 5.54     Eosinophil % (%)   Date Value   12/20/2014 3.1     Eos # (K/uL)   Date Value   12/20/2014 0.2     Platelets (K/uL)   Date Value   12/20/2014 189     Glucose (mg/dL)   Date Value   12/20/2014 96     No results found for: "IGE"    I independently reviewed the images of the CT sinuses dated 9/27/21. Pertinent findings include left septal deviation. Right maxillary sinus mucosal thickening with periapical lucency. Patchy ethmoid sinus opacification bilaterally. No significant opacification of the frontal or sphenoid sinus.        Assessment:     1. Periapical periodontitis    2. Hypertrophy of both inferior nasal turbinates    3. Nasal septal deviation         Plan:     - CT scan to further assess his sinuses, last CT in 2021.  - Strongly recommended evaluation by  his dentist    James Miller MD       "

## 2024-03-04 ENCOUNTER — HOSPITAL ENCOUNTER (EMERGENCY)
Facility: HOSPITAL | Age: 64
Discharge: HOME | End: 2024-03-04
Attending: EMERGENCY MEDICINE
Payer: MEDICAID

## 2024-03-04 VITALS
RESPIRATION RATE: 18 BRPM | OXYGEN SATURATION: 97 % | TEMPERATURE: 98 F | SYSTOLIC BLOOD PRESSURE: 148 MMHG | HEART RATE: 97 BPM | WEIGHT: 149 LBS | BODY MASS INDEX: 24.05 KG/M2 | DIASTOLIC BLOOD PRESSURE: 80 MMHG

## 2024-03-04 DIAGNOSIS — U07.1 COVID-19: Primary | ICD-10-CM

## 2024-03-04 DIAGNOSIS — U07.1 COVID-19 VIRUS DETECTED: ICD-10-CM

## 2024-03-04 PROCEDURE — 87651 STREP A DNA AMP PROBE: CPT

## 2024-03-04 PROCEDURE — 87635 SARS-COV-2 COVID-19 AMP PRB: CPT

## 2024-03-04 PROCEDURE — 99284 EMERGENCY DEPT VISIT MOD MDM: CPT

## 2024-03-04 PROCEDURE — 87502 INFLUENZA DNA AMP PROBE: CPT

## 2024-03-04 RX ORDER — GUAIFENESIN AND DEXTROMETHORPHAN HYDROBROMIDE 10; 100 MG/5ML; MG/5ML
5 SYRUP ORAL EVERY 6 HOURS
Qty: 473 ML | Refills: 0 | Status: SHIPPED | OUTPATIENT
Start: 2024-03-04 | End: 2024-03-14

## 2024-03-04 RX ORDER — PHENOL 1.4 %
AEROSOL, SPRAY (ML) MUCOUS MEMBRANE
Qty: 177 ML | Refills: 0 | Status: SHIPPED | OUTPATIENT
Start: 2024-03-04

## 2024-03-04 RX ORDER — CETIRIZINE HYDROCHLORIDE 10 MG/1
10 TABLET ORAL DAILY
Qty: 30 TABLET | Refills: 0 | Status: SHIPPED | OUTPATIENT
Start: 2024-03-04 | End: 2024-04-03

## 2024-03-04 RX ORDER — ACETAMINOPHEN 500 MG
500 TABLET ORAL EVERY 6 HOURS PRN
Qty: 30 TABLET | Refills: 0 | Status: SHIPPED | OUTPATIENT
Start: 2024-03-04

## 2024-03-04 NOTE — DISCHARGE INSTRUCTIONS
Please hold you're Atorvastatin if taking Paxlovid for the duration of treatment.    Thank you for coming to our Emergency Department today. It is important to remember that some problems or medical conditions are difficult to diagnose and may not be found or addressed during your Emergency Department visit.  These conditions often start with non-specific symptoms and can only be diagnosed on follow up visits with your primary care physician or specialist when the symptoms continue or change. Please remember that all medical conditions can change, and we cannot predict how you will be feeling tomorrow or the next day. Return to the ER with any questions/concerns, new/concerning symptoms, worsening or failure to improve.   Be sure to follow up with your primary care doctor and review all labs/imaging/tests that were performed during your ER visit with them. It is very common for us to identify non-emergent incidental findings which must be followed up with your primary care physician.  Some labs/imaging/tests may be outside of the normal range, and require non-emergent follow-up and/or further investigation/treatment/procedures/testing to help diagnose/exclude/prevent complications or other potentially serious medical conditions. Some abnormalities may not have been discussed or addressed during your ER visit. Some lab results may not return during your ER visit but can be accessible by downloading the free [facility]ard or by visiting [email or web address] . It is important for you to review all labs/imaging/tests which are outside of the normal range with your physician.  An ER visit does not replace a primary care visit, and many screening tests or follow-up tests cannot be ordered by an ER doctor or performed by the ER. Some tests may even require pre-approval.  If you do not have a primary care doctor, you may contact the one listed on your discharge paperwork or you may also call the [facility]  Appointment Desk at [phone] , or [address] [place] [address] at  [phone] to schedule an appointment, or establish care with a primary care doctor or even a specialist and to obtain information about local resources. It is important to your health that you have a primary care doctor.  Please take all medications as directed. We have done our best to select a medication for you that will treat your condition however, all medications may potentially have side-effects and it is impossible to predict which medications may give you side-effects or what those side-effects (if any) those medications may give you.  If you feel that you are having a negative effect or side-effect of any medication you should stop taking those medications immediately and seek medical attention. If you feel that you are having a life-threatening reaction call 911.  Do not drive, swim, climb to height, take a bath, operate heavy machinery, drink alcohol or take potentially sedating medications, sign any legal documents or make any important decisions for 24 hours if you have received any pain medications, sedatives or mood altering drugs during your ER visit or within 24 hours of taking them if they have been prescribed to you.   You can find additional resources for Dentists, hearing aids, durable medical equipment, low cost pharmacies and other resources at [email or web address]  Patient agrees with this plan. Discussed with her strict return precautions, she verbalized understanding. Patient is stable for discharge.

## 2024-03-04 NOTE — ED PROVIDER NOTES
Encounter Date: 3/4/2024       History     Chief Complaint   Patient presents with    Cough     Cough and sore throat for several days     The history is provided by the patient and medical records.   63-year-old male past medical history of hypertension and hyperlipidemia presenting to the emergency department today with a complaint of cough, congestion, sore throat for several days.  Patient was requesting that he be tested for COVID-19.  Patient was not know if he is any known sick contacts.  Denies medications prior to arrival.  Denies any fever, chest pain, shortness for breath, nausea, vomiting, diarrhea, headache, dizziness, weakness.  Review of patient's allergies indicates:   Allergen Reactions    Ace inhibitors Anaphylaxis     Past Medical History:   Diagnosis Date    Hyperlipemia     Hypertension     Ulcer      History reviewed. No pertinent surgical history.  History reviewed. No pertinent family history.  Social History     Tobacco Use    Smoking status: Every Day     Types: Cigarettes    Smokeless tobacco: Never   Substance Use Topics    Alcohol use: No    Drug use: No     Review of Systems   Constitutional:  Negative for chills, diaphoresis, fatigue and fever.   HENT:  Positive for congestion and rhinorrhea. Negative for ear discharge, ear pain, sore throat and trouble swallowing.    Eyes:  Negative for photophobia, redness and visual disturbance.   Respiratory:  Positive for cough. Negative for shortness of breath.    Cardiovascular:  Negative for chest pain, palpitations and leg swelling.   Gastrointestinal:  Negative for abdominal pain, diarrhea, nausea and vomiting.   Genitourinary:  Negative for difficulty urinating, dysuria, flank pain, frequency and hematuria.   Musculoskeletal:  Negative for arthralgias, back pain, myalgias, neck pain and neck stiffness.   Skin:  Negative for pallor and rash.   Neurological:  Negative for dizziness, weakness, light-headedness and headaches.   Hematological:   Does not bruise/bleed easily.       Physical Exam     Initial Vitals [03/04/24 1436]   BP Pulse Resp Temp SpO2   (!) 147/79 98 18 98.2 °F (36.8 °C) 98 %      MAP       --         Physical Exam    Nursing note and vitals reviewed.  Constitutional: He appears well-developed and well-nourished. He is not diaphoretic. He does not appear ill. No distress.   HENT:   Head: Normocephalic and atraumatic.   Right Ear: Tympanic membrane, external ear and ear canal normal.   Left Ear: Tympanic membrane, external ear and ear canal normal.   Nose: Mucosal edema and rhinorrhea present.   Mouth/Throat: Uvula is midline and mucous membranes are normal. No trismus in the jaw. No uvula swelling. Posterior oropharyngeal erythema present. No oropharyngeal exudate, posterior oropharyngeal edema or tonsillar abscesses.   Postnasal drip noted.  Uvula midline without any erythema or swelling.  No submandibular or sublingual swelling or erythema.  No trismus.  Normal jaw occlusion.  No evidence of tonsillar abscess.  No muffled voice.    Patient is tolerating secretions without difficulty.   Patient is speaking in full sentences on exam without difficulty.  There is no postauricular swelling, or overlying erythema or tenderness to palpation over mastoids bilaterally.   Eyes: Conjunctivae, EOM and lids are normal. Pupils are equal, round, and reactive to light. Right eye exhibits no discharge. Left eye exhibits no discharge. No scleral icterus.   Neck: Trachea normal.   Normal range of motion.   Full passive range of motion without pain.     Cardiovascular:  Normal rate, regular rhythm, normal heart sounds and normal pulses.     Exam reveals no distant heart sounds and no friction rub.       Pulmonary/Chest: Effort normal and breath sounds normal. No respiratory distress.   Abdominal: Abdomen is soft. Bowel sounds are normal. He exhibits no distension and no pulsatile midline mass. There is no abdominal tenderness.   No right CVA tenderness.   No left CVA tenderness. There is no rebound and no guarding.   Musculoskeletal:         General: Normal range of motion.      Right shoulder: Normal.      Left shoulder: Normal.      Right elbow: Normal.      Left elbow: Normal.      Right wrist: Normal.      Left wrist: Normal.      Right hand: Normal.      Left hand: Normal.      Cervical back: Normal, full passive range of motion without pain and normal range of motion.      Thoracic back: Normal.      Lumbar back: Normal.      Right hip: Normal.      Left hip: Normal.      Right knee: Normal.      Left knee: Normal.      Right lower leg: Normal.      Left lower leg: Normal.      Right ankle: Normal.      Left ankle: Normal.      Right foot: Normal.      Left foot: Normal.     Lymphadenopathy:        Head (right side): No submental, no submandibular, no tonsillar, no preauricular, no posterior auricular and no occipital adenopathy present.        Head (left side): No submental, no submandibular, no tonsillar, no preauricular, no posterior auricular and no occipital adenopathy present.     He has no cervical adenopathy.   Neurological: He is alert and oriented to person, place, and time. He has normal strength. No cranial nerve deficit or sensory deficit. Gait normal.   Skin: Skin is warm and dry. Capillary refill takes less than 2 seconds. No bruising, no ecchymosis and no rash noted. No erythema.   Psychiatric: He has a normal mood and affect. His speech is normal and behavior is normal. Thought content normal.         ED Course   Procedures  Labs Reviewed   SARS-COV-2 RDRP GENE - Abnormal; Notable for the following components:       Result Value    POC Rapid COVID Positive (*)     All other components within normal limits   POCT STREP A MOLECULAR   POCT INFLUENZA A/B MOLECULAR          Imaging Results    None          Medications - No data to display  Medical Decision Making  63-year-old male past medical history of hypertension and hyperlipidemia presenting to  the emergency department today with a complaint of cough, congestion, sore throat for several days.  Patient was requesting that he be tested for COVID-19.  Patient was not know if he is any known sick contacts.  Denies medications prior to arrival.  Denies any fever, chest pain, shortness for breath, nausea, vomiting, diarrhea, headache, dizziness, weakness.  Patient's chart and medical history reviewed.  Patient's vitals reviewed.  They are afebrile, no respiratory distress, nontoxic-appearing in the ED.  Patient is an afebrile, well-appearing 63 y.o. male. VSS.  Lung sounds are clear and not consistent with pneumonia. There is no neck pain or limited ROM to suggest retropharyngeal abscess or meningitis. Tolerating PO, non-toxic appearing, no hypoxia. The patient remained comfortable and stable during their visit in the ED.  Details of ED course documented in ED workup.     Differential Diagnosis is considered, but is not limited to: COVID, Flu, Strep throat, Viral URI, pneumonia.  Also considered but less likely:  PTA/RPA: no hot potato voice, no uvular deviation,  Esophageal rupture: No history of dysphagia  Unlikely deep space infection/Silviano's, no submandibular or sublingual erythema or swelling.  Low suspicion for CNS infection bacterial sinusitis, or pneumonia given exam and history.  Unlikely EBV as  no posterior LAD, or splenomegaly.    COVID test Positive., Influenza test Negative., and Strep test Negative.  Ambulatory SpO2 ranging from 98 to 97% on room air.  Using shared decision-making patient would like to be prescribed Paxlovid after reviewing patient's medication list instructed patient to hold the atorvastatin for the 5 days treatment duration.  All historical, clinical, and laboratory findings reviewed. Patient with constellation of symptoms most consistent with COVID infection. There are no concerning features on physical exam to suggest an emergent or life threatening condition or an invasive  bacterial infection, including, but not limited to: bacterial otitis media/externa, sinusitis, pharyngitis, or peritonsillar abscess. No further intervention is indicated at this time. The patient is at low risk for an emergent/life threatening medical condition at this time, and I am of the belief that that it is safe to discharge the patient from the emergency department.     Patient instructed to follow up with PCP in 3-5 days for recheck of today's complaints. Patient has been counseled regarding the need for follow-up as well as the indications to return to the emergency room should new or worrisome developments. Discharge and follow-up instructions discussed with the patient who expressed understanding and willingness to comply with recommendations. Patient discharged from the emergency department in stable condition, in no acute distress.  I discussed with the patient/family the diagnosis, treatment plan, indications for return to the emergency department, and for expected follow-up. The patient/family verbalized an understanding. The patient/family is asked if there are any questions or concerns. We discuss the case, until all issues are addressed to the patient/family's satisfaction. Patient/family understands and is agreeable to the plan.   RACHEL SUBRAMANIAN    DISCLAIMER: This note was prepared with [employer] voice recognition transcription software. Garbled syntax, mangled pronouns, and other bizarre constructions may be attributed to that software system.        Amount and/or Complexity of Data Reviewed  Labs: ordered.    Risk  OTC drugs.  Prescription drug management.                                      Clinical Impression:  Final diagnoses:  [U07.1] COVID-19 (Primary)          ED Disposition Condition    Discharge Stable          ED Prescriptions       Medication Sig Dispense Start Date End Date Auth. Provider    benzocaine-menthoL 6-10 mg lozenge Take 1 lozenge by mouth every 2 (two) hours as needed for  Pain. 18 tablet 3/4/2024 -- Chip Garcia PA-C    dextromethorphan-guaiFENesin  mg/5 ml (ROBITUSSIN-DM)  mg/5 mL liquid Take 5 mLs by mouth every 6 (six) hours. for 10 days 473 mL 3/4/2024 3/14/2024 Chip Garcia PA-C    acetaminophen (TYLENOL) 500 MG tablet Take 1 tablet (500 mg total) by mouth every 6 (six) hours as needed for Pain. 30 tablet 3/4/2024 -- Chip Garcia PA-C    phenoL (CHLORASEPTIC THROAT SPRAY) 1.4 % SprA by Mucous Membrane route every 2 (two) hours. 177 mL 3/4/2024 -- Chip Garcia PA-C    cetirizine (ZYRTEC) 10 MG tablet Take 1 tablet (10 mg total) by mouth once daily. 30 tablet 3/4/2024 4/3/2024 Chip Garcia PA-C    nirmatrelvir-ritonavir 300 mg (150 mg x 2)-100 mg copackaged tablets (EUA) Take 3 tablets by mouth 2 (two) times daily for 5 days. Each dose contains 2 nirmatrelvir (pink tablets) and 1 ritonavir (white tablet). Take all 3 tablets together 30 tablet 3/4/2024 3/9/2024 Chip Garcia PA-C          Follow-up Information       Follow up With Specialties Details Why Contact Info    Your primary care physician  Schedule an appointment as soon as possible for a visit in 3 days for follow up              Chip Garcia PA-C  03/04/24 0876       Chip Garcia PA-C  03/04/24 6646